# Patient Record
Sex: FEMALE | Race: WHITE | NOT HISPANIC OR LATINO | URBAN - METROPOLITAN AREA
[De-identification: names, ages, dates, MRNs, and addresses within clinical notes are randomized per-mention and may not be internally consistent; named-entity substitution may affect disease eponyms.]

---

## 2017-01-12 ENCOUNTER — HOSPITAL ENCOUNTER (OUTPATIENT)
Dept: MAMMOGRAPHY | Facility: CLINIC | Age: 62
Discharge: HOME | End: 2017-01-12
Attending: OBSTETRICS & GYNECOLOGY | Admitting: OBSTETRICS & GYNECOLOGY
Payer: COMMERCIAL

## 2017-01-12 DIAGNOSIS — Z12.31 VISIT FOR SCREENING MAMMOGRAM: ICD-10-CM

## 2017-01-12 PROCEDURE — G0202 SCR MAMMO BI INCL CAD: HCPCS

## 2018-03-05 ENCOUNTER — HOSPITAL ENCOUNTER (OUTPATIENT)
Dept: MAMMOGRAPHY | Facility: CLINIC | Age: 63
Discharge: HOME | End: 2018-03-05
Attending: OBSTETRICS & GYNECOLOGY | Admitting: OBSTETRICS & GYNECOLOGY
Payer: COMMERCIAL

## 2018-03-05 DIAGNOSIS — Z12.31 ENCOUNTER FOR SCREENING MAMMOGRAM FOR HIGH-RISK PATIENT: ICD-10-CM

## 2018-03-05 PROCEDURE — 77067 SCR MAMMO BI INCL CAD: CPT

## 2018-06-04 ENCOUNTER — TRANSFERRED RECORDS (OUTPATIENT)
Dept: HEALTH INFORMATION MANAGEMENT | Facility: CLINIC | Age: 63
End: 2018-06-04

## 2018-06-05 ENCOUNTER — HOSPITAL ENCOUNTER (EMERGENCY)
Facility: CLINIC | Age: 63
Discharge: HOME | End: 2018-06-05
Attending: EMERGENCY MEDICINE | Admitting: EMERGENCY MEDICINE
Payer: COMMERCIAL

## 2018-06-05 VITALS
SYSTOLIC BLOOD PRESSURE: 143 MMHG | DIASTOLIC BLOOD PRESSURE: 90 MMHG | HEART RATE: 75 BPM | RESPIRATION RATE: 16 BRPM | OXYGEN SATURATION: 98 % | WEIGHT: 155 LBS | TEMPERATURE: 98.1 F

## 2018-06-05 DIAGNOSIS — R10.32 ABDOMINAL PAIN, LEFT LOWER QUADRANT: ICD-10-CM

## 2018-06-05 PROCEDURE — 99282 EMERGENCY DEPT VISIT SF MDM: CPT

## 2018-06-05 ASSESSMENT — ENCOUNTER SYMPTOMS
BLOOD IN STOOL: 0
ABDOMINAL PAIN: 1

## 2018-06-05 NOTE — ED AVS SNAPSHOT
Emergency Department    [address] [place] [address]    Phone:  [phone]    Fax:  [phone]                                       Erica Dimas   MRN: [id number]    Department:   Emergency Department   Date of Visit:  6/5/2018           After Visit Summary Signature Page     I have received my discharge instructions, and my questions have been answered. I have discussed any challenges I see with this plan with the nurse or doctor.    ..........................................................................................................................................  Patient/Patient Representative Signature      ..........................................................................................................................................  Patient Representative Print Name and Relationship to Patient    ..................................................               ................................................  Date                                            Time    ..........................................................................................................................................  Reviewed by Signature/Title    ...................................................              ..............................................  Date                                                            Time

## 2018-06-05 NOTE — ED PROVIDER NOTES
History     Chief Complaint:    Abdominal Pain    HPI   Erica Dimas is a 62 year old female who presents to the ED today with abdominal pain. The patient states that she had a colonoscopy done yesterday at around 1300. She notes that this was a routine colonoscopy. Since that time, she has had some cramping pain on the left side of her abdomen. The patient denies any bleeding but states that she has not had any bowel movement and has not passed gas since her procedure.       Allergies:  No known drug allergies.     Medications:    Amitriptyline   Cozaar   Paxil   Prempro   Vicodin      Past Medical History:    Depressive disorder   Hypertension   Hypercholesteremia   Insomnia   Menopausal and postmenopausal disorder     Past Surgical History:    Colonoscopy     Family History:    Hypertension   Genitourinary problems     Social History:  Marital Status:    Presents to the ED alone   Tobacco Use: former smoker   Alcohol Use: yes   PCP: Erin Andres     Review of Systems   Constitutional: Negative for fever.   Respiratory: Negative for cough and shortness of breath.    Cardiovascular: Negative for chest pain.   Gastrointestinal: Positive for abdominal pain. Negative for blood in stool, nausea and vomiting.   All other systems reviewed and are negative.      Physical Exam   First Vitals:  BP: 143/90  Pulse: 75  Temp: 98.1  F (36.7  C)  Resp: 16  Weight: 70.3 kg (155 lb)  SpO2: 98 %      Physical Exam  General: Appears well-developed and well-nourished.   Head: No signs of trauma.   CV: Normal rate and regular rhythm.    Resp: Effort normal and breath sounds normal. No respiratory distress.   GI: Soft. There is no specific tenderness.  No rebound or guarding.  Normal bowel sounds.  No CVA tenderness.  MSK: Normal range of motion. no edema. No Calf tenderness.  Neuro: The patient is alert and oriented.  Speech normal.  GCS 15  Skin: Skin is warm and dry. No rash noted.   Psych: normal mood and affect.  behavior is normal.       Emergency Department Course   Emergency Department Course:  Nursing notes and vitals reviewed.  (4605) I performed an exam of the patient as documented above.    After discussing the risks and benefits of imaging, the patient chose to defer both xray and CT scans at this time.   Findings and plan explained to the patient. Patient discharged home with instructions regarding supportive care, medications, and reasons to return. The importance of close follow-up was reviewed.   Impression & Plan    Medical Decision Making:  Erica Dimas is a 62 year old female who presents with lower abdominal pain after colonoscopy.  Patient had a routine screening colonoscopy done that was completed without difficulty.  She reports that she has had some cramping and discomfort more on the left side of the abdomen and when she called the clinic she was instructed to come to the ER.  On my evaluation, her abdomen was quite soft with no tenderness to the right side.  She had only very minimal tenderness to the left lower quadrant but no peritoneal findings.  Given her overall very benign exam, I did discuss diagnostic options including watchful waiting, x-ray, or CT scan.  I did discuss that CT scan is by far the most sensitive test to evaluate for perforation or other pathology but also discussed the radiation exposure.  The patient's exam was very benign and her symptoms are minimal, patient preferred to not do any imaging but rather watch the symptoms and return if she had worsening pain, fevers, blood in her stool, or any other concerns.  I felt that this was very reasonable as the patient seemed to have good insight into her condition and the ability to return.  She was recommended to try Tylenol and warm packs to see if this would provide any relief.       Diagnosis:    ICD-10-CM    1. Abdominal pain, left lower quadrant R10.32        Disposition:  discharged to home      Diane PATRICK am  serving as a scribe on 6/5/2018 at 5:44 PM to personally document services performed by Dr. Omalley based on my observations and the provider's statements to me.    6/5/2018    EMERGENCY DEPARTMENT       Jed Omalley MD  06/09/18 7014

## 2018-06-05 NOTE — ED AVS SNAPSHOT
Emergency Department    [address] [facility] [address]    Phone:  [phone]    Fax:  [phone]                                       Erica Dimas   MRN: [id number]    Department:   Emergency Department   Date of Visit:  6/5/2018           Patient Information     Date Of Birth          1955        Your diagnoses for this visit were:     Abdominal pain, left lower quadrant        You were seen by Jed Omalley MD.      Follow-up Information     Follow up with Erin Andres In 5 days.    Specialty:  OB/Gyn    Contact information:    [facility]  [address]  [facility] [id number] [phone]          Follow up with  Emergency Department.    Specialty:  EMERGENCY MEDICINE    Why:  As needed, If symptoms worsen    Contact information:    [address] [facility] [id number] [phone]        Discharge Instructions         Abdominal Pain  You were seen for pain after a colonoscopy.  Your exam is reassuring at this time.  We discussed doing further testing including a CAT scan to look for a perforation from the procedure.  At this time you have decided to not do the CAT scan and monitor your symptoms.  Please return of you have worsening pain, fevers, or any other new or concerning symptoms.    Abdominal pain is pain in the stomach or belly area. Everyone has this pain from time to time. In many cases it goes away on its own. But abdominal pain can sometimes be due to a serious problem, such as appendicitis. So it s important to know when to seek help.  Causes of abdominal pain  There are many possible causes of abdominal pain. Common causes in adults include:    Constipation, diarrhea, or gas    Stomach acid flowing back up into the esophagus (acid reflux or heartburn)    Severe acid reflux, called GERD (gastroesophageal reflux disease)    A sore in the lining of the stomach or small intestine (peptic ulcer)    Inflammation of  the gallbladder, liver, or pancreas    Gallstones or kidney stones    Appendicitis     Intestinal blockage     An internal organ pushing through a muscle or other tissue (hernia)    Urinary tract infections    In women, menstrual cramps, fibroids, or endometriosis    Inflammation or infection of the intestines  Diagnosing the cause of abdominal pain  Your healthcare provider will do a physical exam help find the cause of your pain. If needed, tests will be ordered. Belly pain has many possible causes. So it can be hard to find the reason for your pain. Giving details about your pain can help. Tell your provider where and when you feel the pain, and what makes it better or worse. Also let your provider know if you have other symptoms such as:    Fever    Tiredness    Upset stomach (nausea)    Vomiting    Changes in bathroom habits  Treating abdominal pain  Some causes of pain need emergency medical treatment right away. These include appendicitis or a bowel blockage. Other problems can be treated with rest, fluids, or medicines. Your healthcare provider can give you specific instructions for treatment or self-care based on what is causing your pain.  If you have vomiting or diarrhea, sip water or other clear fluids. When you are ready to eat solid foods again, start with small amounts of easy-to-digest, low-fat foods. These include apple sauce, toast, or crackers.   When to seek medical care  Call 911 or go to the hospital right away if you:    Can t pass stool and are vomiting    Are vomiting blood or have bloody diarrhea or black, tarry diarrhea    Have chest, neck, or shoulder pain    Feel like you might pass out    Have pain in your shoulder blades with nausea    Have sudden, severe belly pain    Have new, severe pain unlike any you have felt before    Have a belly that is rigid, hard, and tender to touch  Call your healthcare provider if you have:    Pain for more than 5 days    Bloating for more than  2 days    Diarrhea for more than 5 days    A fever of 100.4 F (38 C) or higher, or as directed by your healthcare provider    Pain that gets worse    Weight loss for no reason    Continued lack of appetite    Blood in your stool  How to prevent abdominal pain  Here are some tips to help prevent abdominal pain:    Eat smaller amounts of food at one time.    Avoid greasy, fried, or other high-fat foods.    Avoid foods that give you gas.    Exercise regularly.    Drink plenty of fluids.  To help prevent GERD symptoms:    Quit smoking.    Reduce alcohol and certain foods that increase stomach acid.    Avoid aspirin and over-the-counter pain and fever medicines (NSAIDS or nonsteroidal anti-inflammatory drugs), if possible    Lose extra weight.    Finish eating at least 2 hours before you go to bed or lie down.    Raise the head of your bed.  Date Last Reviewed: 7/1/2016 2000-2017 [employer]. [address] [place] [address]. All rights reserved. This information is not intended as a substitute for professional medical care. Always follow your healthcare professional's instructions.          [facility] Scheduling Hotline     To schedule an appointment at [facility], please call [phone]. If you don't have a family doctor or clinic, we will help you find one. [facility] clinics are conveniently located to serve the needs of you and your family.           Review of your medicines      Our records show that you are taking the medicines listed below. If these are incorrect, please call your family doctor or clinic.        Dose / Directions Last dose taken    amitriptyline 10 MG tablet   Commonly known as:  ELAVIL   Quantity:  30        1 tab po qhs X 1 month; then change dosing prn   Refills:  0        COZAAR 100 MG tablet   Generic drug:  losartan        1 TABLET DAILY   Refills:  0        flax seed oil 1000 MG capsule        Refills:  0        MULTI-DAY Tabs        Refills:  0        PATANOL  0.1 % ophthalmic solution   Quantity:  1   Generic drug:  olopatadine        use as directed prn   Refills:  0        PAXIL 40 MG tablet   Generic drug:  PARoxetine        1 TABLET DAILY   Refills:  0        PREMPRO 0.625-2.5 MG per tablet   Quantity:  90   Generic drug:  estrogen (conjugated)-medroxyPROGESTERone        1 tab PO QD (Once per day)   Refills:  3        VICODIN 5-500 MG per tablet   Quantity:  28   Generic drug:  HYDROcodone-acetaminophen        ONE TO TWO TABLETS EVERY 4 TO 6 HOURS AS NEEDED FOR PAIN   Refills:  0                Orders Needing Specimen Collection     None      Pending Results     No orders found from 6/3/2018 to 6/6/2018.            Pending Culture Results     No orders found from 6/3/2018 to 6/6/2018.            Pending Results Instructions     If you had any lab results that were not finalized at the time of your Discharge, you can call the ED Lab Result RN at [phone]. You will be contacted by this team for any positive Lab results or changes in treatment. The nurses are available 7 days a week from 10A to 6:30P.  You can leave a message 24 hours per day and they will return your call.        Test Results From Your Hospital Stay               Clinical Quality Measure: Blood Pressure Screening     Your blood pressure was checked while you were in the emergency department today. The last reading we obtained was  BP: 143/90 . Please read the guidelines below about what these numbers mean and what you should do about them.  If your systolic blood pressure (the top number) is less than 120 and your diastolic blood pressure (the bottom number) is less than 80, then your blood pressure is normal. There is nothing more that you need to do about it.  If your systolic blood pressure (the top number) is 120-139 or your diastolic blood pressure (the bottom number) is 80-89, your blood pressure may be higher than it should be. You should have your blood pressure rechecked within a year by a  "primary care provider.  If your systolic blood pressure (the top number) is 140 or greater or your diastolic blood pressure (the bottom number) is 90 or greater, you may have high blood pressure. High blood pressure is treatable, but if left untreated over time it can put you at risk for heart attack, stroke, or kidney failure. You should have your blood pressure rechecked by a primary care provider within the next 4 weeks.  If your provider in the emergency department today gave you specific instructions to follow-up with your doctor or provider even sooner than that, you should follow that instruction and not wait for up to 4 weeks for your follow-up visit.        Thank you for choosing [facility]       Thank you for choosing [facility] for your care. Our goal is always to provide you with excellent care. Hearing back from our patients is one way we can continue to improve our services. Please take a few minutes to complete the written survey that you may receive in the mail after you visit with us. Thank you!        [employer] Information     [employer] lets you send messages to your doctor, view your test results, renew your prescriptions, schedule appointments and more. To sign up, go to [email or web address] . Click on \"Log in\" on the left side of the screen, which will take you to the Welcome page. Then click on \"Sign up Now\" on the right side of the page.     You will be asked to enter the access code listed below, as well as some personal information. Please follow the directions to create your username and password.     Your access code is: [email or web address]  Expires: 9/3/2018  6:07 PM     Your access code will  in 90 days. If you need help or a new code, please call your [facility] clinic or [phone].        Care EveryWhere ID     This is your Care EveryWhere ID. This could be used by other organizations to access your [facility] medical records  [id number]        Equal Access to Services     GLENN GALLEGOS AH: " Hadii charmaine Galan, wawestonda sharan, qaelishata kaalaugustina mcdonald. So [facility] [phone].    ATENCIÓN: Si habla español, tiene a haney disposición servicios gratuitos de asistencia lingüística. Llame al [phone].    We comply with applicable federal civil rights laws and [place] laws. We do not discriminate on the basis of race, color, national origin, age, disability, sex, sexual orientation, or gender identity.            After Visit Summary       This is your record. Keep this with you and show to your community pharmacist(s) and doctor(s) at your next visit.

## 2018-06-05 NOTE — DISCHARGE INSTRUCTIONS
Abdominal Pain  You were seen for pain after a colonoscopy.  Your exam is reassuring at this time.  We discussed doing further testing including a CAT scan to look for a perforation from the procedure.  At this time you have decided to not do the CAT scan and monitor your symptoms.  Please return of you have worsening pain, fevers, or any other new or concerning symptoms.    Abdominal pain is pain in the stomach or belly area. Everyone has this pain from time to time. In many cases it goes away on its own. But abdominal pain can sometimes be due to a serious problem, such as appendicitis. So it s important to know when to seek help.  Causes of abdominal pain  There are many possible causes of abdominal pain. Common causes in adults include:    Constipation, diarrhea, or gas    Stomach acid flowing back up into the esophagus (acid reflux or heartburn)    Severe acid reflux, called GERD (gastroesophageal reflux disease)    A sore in the lining of the stomach or small intestine (peptic ulcer)    Inflammation of the gallbladder, liver, or pancreas    Gallstones or kidney stones    Appendicitis     Intestinal blockage     An internal organ pushing through a muscle or other tissue (hernia)    Urinary tract infections    In women, menstrual cramps, fibroids, or endometriosis    Inflammation or infection of the intestines  Diagnosing the cause of abdominal pain  Your healthcare provider will do a physical exam help find the cause of your pain. If needed, tests will be ordered. Belly pain has many possible causes. So it can be hard to find the reason for your pain. Giving details about your pain can help. Tell your provider where and when you feel the pain, and what makes it better or worse. Also let your provider know if you have other symptoms such as:    Fever    Tiredness    Upset stomach (nausea)    Vomiting    Changes in bathroom habits  Treating abdominal pain  Some causes of pain need emergency medical treatment  right away. These include appendicitis or a bowel blockage. Other problems can be treated with rest, fluids, or medicines. Your healthcare provider can give you specific instructions for treatment or self-care based on what is causing your pain.  If you have vomiting or diarrhea, sip water or other clear fluids. When you are ready to eat solid foods again, start with small amounts of easy-to-digest, low-fat foods. These include apple sauce, toast, or crackers.   When to seek medical care  Call 911 or go to the hospital right away if you:    Can t pass stool and are vomiting    Are vomiting blood or have bloody diarrhea or black, tarry diarrhea    Have chest, neck, or shoulder pain    Feel like you might pass out    Have pain in your shoulder blades with nausea    Have sudden, severe belly pain    Have new, severe pain unlike any you have felt before    Have a belly that is rigid, hard, and tender to touch  Call your healthcare provider if you have:    Pain for more than 5 days    Bloating for more than 2 days    Diarrhea for more than 5 days    A fever of 100.4 F (38 C) or higher, or as directed by your healthcare provider    Pain that gets worse    Weight loss for no reason    Continued lack of appetite    Blood in your stool  How to prevent abdominal pain  Here are some tips to help prevent abdominal pain:    Eat smaller amounts of food at one time.    Avoid greasy, fried, or other high-fat foods.    Avoid foods that give you gas.    Exercise regularly.    Drink plenty of fluids.  To help prevent GERD symptoms:    Quit smoking.    Reduce alcohol and certain foods that increase stomach acid.    Avoid aspirin and over-the-counter pain and fever medicines (NSAIDS or nonsteroidal anti-inflammatory drugs), if possible    Lose extra weight.    Finish eating at least 2 hours before you go to bed or lie down.    Raise the head of your bed.  Date Last Reviewed: 7/1/2016 2000-2017 [employer]. [address] [place] [address]  [place] [address]. All rights reserved. This information is not intended as a substitute for professional medical care. Always follow your healthcare professional's instructions.

## 2018-06-05 NOTE — ED AVS SNAPSHOT
Emergency Department    [address] [facility] [address]    Phone:  [phone]    Fax:  [phone]                                       Erica Dimas   MRN: [id number]    Department:   Emergency Department   Date of Visit:  6/5/2018           Patient Information     Date Of Birth          1955        Your diagnoses for this visit were:     Abdominal pain, left lower quadrant        You were seen by Jed Omalley MD.      Follow-up Information     Follow up with Erin Andres In 5 days.    Specialty:  OB/Gyn    Contact information:    [facility] [place]  [address]  [facility] [address] [phone]          Follow up with  Emergency Department.    Specialty:  EMERGENCY MEDICINE    Why:  As needed, If symptoms worsen    Contact information:    [address] [facility] [id number] [phone]        Discharge Instructions         Abdominal Pain  You were seen for pain after a colonoscopy.  Your exam is reassuring at this time.  We discussed doing further testing including a CAT scan to look for a perforation from the procedure.  At this time you have decided to not do the CAT scan and monitor your symptoms.  Please return of you have worsening pain, fevers, or any other new or concerning symptoms.    Abdominal pain is pain in the stomach or belly area. Everyone has this pain from time to time. In many cases it goes away on its own. But abdominal pain can sometimes be due to a serious problem, such as appendicitis. So it s important to know when to seek help.  Causes of abdominal pain  There are many possible causes of abdominal pain. Common causes in adults include:    Constipation, diarrhea, or gas    Stomach acid flowing back up into the esophagus (acid reflux or heartburn)    Severe acid reflux, called GERD (gastroesophageal reflux disease)    A sore in the lining of the stomach or small intestine (peptic ulcer)    Inflammation of  the gallbladder, liver, or pancreas    Gallstones or kidney stones    Appendicitis     Intestinal blockage     An internal organ pushing through a muscle or other tissue (hernia)    Urinary tract infections    In women, menstrual cramps, fibroids, or endometriosis    Inflammation or infection of the intestines  Diagnosing the cause of abdominal pain  Your healthcare provider will do a physical exam help find the cause of your pain. If needed, tests will be ordered. Belly pain has many possible causes. So it can be hard to find the reason for your pain. Giving details about your pain can help. Tell your provider where and when you feel the pain, and what makes it better or worse. Also let your provider know if you have other symptoms such as:    Fever    Tiredness    Upset stomach (nausea)    Vomiting    Changes in bathroom habits  Treating abdominal pain  Some causes of pain need emergency medical treatment right away. These include appendicitis or a bowel blockage. Other problems can be treated with rest, fluids, or medicines. Your healthcare provider can give you specific instructions for treatment or self-care based on what is causing your pain.  If you have vomiting or diarrhea, sip water or other clear fluids. When you are ready to eat solid foods again, start with small amounts of easy-to-digest, low-fat foods. These include apple sauce, toast, or crackers.   When to seek medical care  Call 911 or go to the hospital right away if you:    Can t pass stool and are vomiting    Are vomiting blood or have bloody diarrhea or black, tarry diarrhea    Have chest, neck, or shoulder pain    Feel like you might pass out    Have pain in your shoulder blades with nausea    Have sudden, severe belly pain    Have new, severe pain unlike any you have felt before    Have a belly that is rigid, hard, and tender to touch  Call your healthcare provider if you have:    Pain for more than 5 days    Bloating for more than  2 days    Diarrhea for more than 5 days    A fever of 100.4 F (38 C) or higher, or as directed by your healthcare provider    Pain that gets worse    Weight loss for no reason    Continued lack of appetite    Blood in your stool  How to prevent abdominal pain  Here are some tips to help prevent abdominal pain:    Eat smaller amounts of food at one time.    Avoid greasy, fried, or other high-fat foods.    Avoid foods that give you gas.    Exercise regularly.    Drink plenty of fluids.  To help prevent GERD symptoms:    Quit smoking.    Reduce alcohol and certain foods that increase stomach acid.    Avoid aspirin and over-the-counter pain and fever medicines (NSAIDS or nonsteroidal anti-inflammatory drugs), if possible    Lose extra weight.    Finish eating at least 2 hours before you go to bed or lie down.    Raise the head of your bed.  Date Last Reviewed: 7/1/2016 2000-2017 [employer]. [address] [place] [address]. All rights reserved. This information is not intended as a substitute for professional medical care. Always follow your healthcare professional's instructions.          24 Hour Appointment Hotline       To make an appointment at any [facility], call [phone] ([phone]). If you don't have a family doctor or clinic, we will help you find one. [facility] clinics are conveniently located to serve the needs of you and your family.             Review of your medicines      Our records show that you are taking the medicines listed below. If these are incorrect, please call your family doctor or clinic.        Dose / Directions Last dose taken    amitriptyline 10 MG tablet   Commonly known as:  ELAVIL   Quantity:  30        1 tab po qhs X 1 month; then change dosing prn   Refills:  0        COZAAR 100 MG tablet   Generic drug:  losartan        1 TABLET DAILY   Refills:  0        flax seed oil 1000 MG capsule        Refills:  0        MULTI-DAY Tabs        Refills:  0         PATANOL 0.1 % ophthalmic solution   Quantity:  1   Generic drug:  olopatadine        use as directed prn   Refills:  0        PAXIL 40 MG tablet   Generic drug:  PARoxetine        1 TABLET DAILY   Refills:  0        PREMPRO 0.625-2.5 MG per tablet   Quantity:  90   Generic drug:  estrogen (conjugated)-medroxyPROGESTERone        1 tab PO QD (Once per day)   Refills:  3        VICODIN 5-500 MG per tablet   Quantity:  28   Generic drug:  HYDROcodone-acetaminophen        ONE TO TWO TABLETS EVERY 4 TO 6 HOURS AS NEEDED FOR PAIN   Refills:  0                Orders Needing Specimen Collection     None      Pending Results     No orders found from 6/3/2018 to 6/6/2018.            Pending Culture Results     No orders found from 6/3/2018 to 6/6/2018.            Pending Results Instructions     If you had any lab results that were not finalized at the time of your Discharge, you can call the ED Lab Result RN at [phone]. You will be contacted by this team for any positive Lab results or changes in treatment. The nurses are available 7 days a week from 10A to 6:30P.  You can leave a message 24 hours per day and they will return your call.        Test Results From Your Hospital Stay               Clinical Quality Measure: Blood Pressure Screening     Your blood pressure was checked while you were in the emergency department today. The last reading we obtained was  BP: 143/90 . Please read the guidelines below about what these numbers mean and what you should do about them.  If your systolic blood pressure (the top number) is less than 120 and your diastolic blood pressure (the bottom number) is less than 80, then your blood pressure is normal. There is nothing more that you need to do about it.  If your systolic blood pressure (the top number) is 120-139 or your diastolic blood pressure (the bottom number) is 80-89, your blood pressure may be higher than it should be. You should have your blood pressure rechecked  "within a year by a primary care provider.  If your systolic blood pressure (the top number) is 140 or greater or your diastolic blood pressure (the bottom number) is 90 or greater, you may have high blood pressure. High blood pressure is treatable, but if left untreated over time it can put you at risk for heart attack, stroke, or kidney failure. You should have your blood pressure rechecked by a primary care provider within the next 4 weeks.  If your provider in the emergency department today gave you specific instructions to follow-up with your doctor or provider even sooner than that, you should follow that instruction and not wait for up to 4 weeks for your follow-up visit.        Thank you for choosing [facility]       Thank you for choosing [facility] for your care. Our goal is always to provide you with excellent care. Hearing back from our patients is one way we can continue to improve our services. Please take a few minutes to complete the written survey that you may receive in the mail after you visit with us. Thank you!        [employer] Information     [employer] lets you send messages to your doctor, view your test results, renew your prescriptions, schedule appointments and more. To sign up, go to [email or web address] . Click on \"Log in\" on the left side of the screen, which will take you to the Welcome page. Then click on \"Sign up Now\" on the right side of the page.     You will be asked to enter the access code listed below, as well as some personal information. Please follow the directions to create your username and password.     Your access code is: [email or web address]  Expires: 9/3/2018  6:07 PM     Your access code will  in 90 days. If you need help or a new code, please call your [facility] clinic or [phone].        Care EveryWhere ID     This is your Care EveryWhere ID. This could be used by other organizations to access your [facility] medical records  [id number]        Equal Access to Services  "    GLENN GALLEGOS : Hadii charmaine Galan, waaxda luqadaha, qaybta kaalmada pranav, augustina abernathy. So [facility] [phone].    ATENCIÓN: Si habla español, tiene a haney disposición servicios gratuitos de asistencia lingüística. Llame al [phone].    We comply with applicable federal civil rights laws and [place] laws. We do not discriminate on the basis of race, color, national origin, age, disability, sex, sexual orientation, or gender identity.            After Visit Summary       This is your record. Keep this with you and show to your community pharmacist(s) and doctor(s) at your next visit.

## 2018-06-09 ASSESSMENT — ENCOUNTER SYMPTOMS
VOMITING: 0
FEVER: 0
NAUSEA: 0
SHORTNESS OF BREATH: 0
COUGH: 0

## 2019-03-06 ENCOUNTER — HOSPITAL ENCOUNTER (OUTPATIENT)
Dept: MAMMOGRAPHY | Facility: CLINIC | Age: 64
Discharge: HOME | End: 2019-03-06
Attending: OBSTETRICS & GYNECOLOGY | Admitting: OBSTETRICS & GYNECOLOGY
Payer: COMMERCIAL

## 2019-03-06 DIAGNOSIS — Z12.31 VISIT FOR SCREENING MAMMOGRAM: ICD-10-CM

## 2019-03-06 PROCEDURE — 77067 SCR MAMMO BI INCL CAD: CPT

## 2020-03-10 ENCOUNTER — HOSPITAL ENCOUNTER (OUTPATIENT)
Dept: MAMMOGRAPHY | Facility: CLINIC | Age: 65
Discharge: HOME | End: 2020-03-10
Attending: OBSTETRICS & GYNECOLOGY | Admitting: OBSTETRICS & GYNECOLOGY
Payer: COMMERCIAL

## 2020-03-10 DIAGNOSIS — Z12.31 VISIT FOR SCREENING MAMMOGRAM: ICD-10-CM

## 2020-03-10 PROCEDURE — 77067 SCR MAMMO BI INCL CAD: CPT

## 2021-03-11 ENCOUNTER — HOSPITAL ENCOUNTER (OUTPATIENT)
Dept: MAMMOGRAPHY | Facility: CLINIC | Age: 66
Discharge: HOME | End: 2021-03-11
Attending: OBSTETRICS & GYNECOLOGY | Admitting: OBSTETRICS & GYNECOLOGY
Payer: COMMERCIAL

## 2021-03-11 DIAGNOSIS — Z12.31 VISIT FOR SCREENING MAMMOGRAM: ICD-10-CM

## 2021-03-11 PROCEDURE — 77063 BREAST TOMOSYNTHESIS BI: CPT

## 2021-03-21 ENCOUNTER — HEALTH MAINTENANCE LETTER (OUTPATIENT)
Age: 66
End: 2021-03-21

## 2021-09-05 ENCOUNTER — HEALTH MAINTENANCE LETTER (OUTPATIENT)
Age: 66
End: 2021-09-05

## 2022-03-21 ENCOUNTER — HOSPITAL ENCOUNTER (OUTPATIENT)
Dept: MAMMOGRAPHY | Facility: CLINIC | Age: 67
Discharge: HOME | End: 2022-03-21
Attending: OBSTETRICS & GYNECOLOGY | Admitting: OBSTETRICS & GYNECOLOGY
Payer: COMMERCIAL

## 2022-03-21 DIAGNOSIS — Z12.31 VISIT FOR SCREENING MAMMOGRAM: ICD-10-CM

## 2022-03-21 PROCEDURE — 77067 SCR MAMMO BI INCL CAD: CPT

## 2022-04-16 ENCOUNTER — HEALTH MAINTENANCE LETTER (OUTPATIENT)
Age: 67
End: 2022-04-16

## 2022-10-22 ENCOUNTER — HEALTH MAINTENANCE LETTER (OUTPATIENT)
Age: 67
End: 2022-10-22

## 2023-10-24 ENCOUNTER — HOSPITAL ENCOUNTER (OUTPATIENT)
Dept: MAMMOGRAPHY | Facility: CLINIC | Age: 68
Discharge: HOME | End: 2023-10-24
Attending: INTERNAL MEDICINE | Admitting: INTERNAL MEDICINE
Payer: COMMERCIAL

## 2023-10-24 DIAGNOSIS — Z12.31 VISIT FOR SCREENING MAMMOGRAM: ICD-10-CM

## 2023-10-24 PROCEDURE — 77067 SCR MAMMO BI INCL CAD: CPT

## 2023-11-05 ENCOUNTER — HEALTH MAINTENANCE LETTER (OUTPATIENT)
Age: 68
End: 2023-11-05

## 2024-03-02 ENCOUNTER — OFFICE VISIT (OUTPATIENT)
Dept: URGENT CARE | Facility: URGENT CARE | Age: 69
End: 2024-03-02
Payer: COMMERCIAL

## 2024-03-02 VITALS
SYSTOLIC BLOOD PRESSURE: 118 MMHG | DIASTOLIC BLOOD PRESSURE: 83 MMHG | HEART RATE: 88 BPM | OXYGEN SATURATION: 99 % | TEMPERATURE: 98.7 F | RESPIRATION RATE: 18 BRPM | WEIGHT: 152 LBS

## 2024-03-02 DIAGNOSIS — J01.90 ACUTE SINUSITIS WITH SYMPTOMS > 10 DAYS: ICD-10-CM

## 2024-03-02 DIAGNOSIS — J30.89 NON-SEASONAL ALLERGIC RHINITIS, UNSPECIFIED TRIGGER: Primary | ICD-10-CM

## 2024-03-02 PROCEDURE — 99203 OFFICE O/P NEW LOW 30 MIN: CPT | Performed by: PHYSICIAN ASSISTANT

## 2024-03-02 RX ORDER — LEVOCETIRIZINE DIHYDROCHLORIDE 5 MG/1
5 TABLET, FILM COATED ORAL EVERY EVENING
Qty: 30 TABLET | Refills: 11 | Status: SHIPPED | OUTPATIENT
Start: 2024-03-02

## 2024-03-02 RX ORDER — PREDNISONE 20 MG/1
20 TABLET ORAL DAILY
Qty: 5 TABLET | Refills: 0 | Status: SHIPPED | OUTPATIENT
Start: 2024-03-02 | End: 2024-03-07

## 2024-03-02 RX ORDER — FLUTICASONE PROPIONATE 50 MCG
1 SPRAY, SUSPENSION (ML) NASAL DAILY
Qty: 18.2 ML | Refills: 11 | Status: SHIPPED | OUTPATIENT
Start: 2024-03-02

## 2024-03-02 RX ORDER — CEFUROXIME AXETIL 500 MG/1
500 TABLET ORAL 2 TIMES DAILY
Qty: 20 TABLET | Refills: 0 | Status: SHIPPED | OUTPATIENT
Start: 2024-03-02 | End: 2024-03-07

## 2024-03-02 NOTE — PATIENT INSTRUCTIONS
(J30.89) Non-seasonal allergic rhinitis, unspecified trigger  (primary encounter diagnosis)  Comment:   Plan: fluticasone (FLONASE) 50 MCG/ACT nasal spray 2 sprays each nostril at bedtime (you may decreased to 1 spray each nostril at bedtime if you r nasal passages feel dry),         levocetirizine (XYZAL) 5 MG tablet, predniSONE         (DELTASONE) 20 MG tablet            Use saline nasal spray during the day.    STAY ON THE ALLERGY MEDICATION DAILY.         (J01.90) Acute sinusitis with symptoms > 10 days  Comment: start this ONLY if you do not feel relief after 5 days of the prednisone  Plan: cefuroxime (CEFTIN) 500 MG tablet            Follow up with ENT should symptoms persist or worsen.

## 2024-03-02 NOTE — PROGRESS NOTES
Patient presents with:  Sinus Problem: Ongoing sinus infection. Was treated with Augmentin and guaifenesin on 02/14/2024. Negative covid test at home. Complains of cough and post nasal drip    (J30.89) Non-seasonal allergic rhinitis, unspecified trigger  (primary encounter diagnosis)  Comment:   Plan: fluticasone (FLONASE) 50 MCG/ACT nasal spray 2 sprays each nostril at bedtime (you may decreased to 1 spray each nostril at bedtime if you r nasal passages feel dry),         levocetirizine (XYZAL) 5 MG tablet, predniSONE         (DELTASONE) 20 MG tablet            Use saline nasal spray during the day.    STAY ON THE ALLERGY MEDICATION DAILY.         (J01.90) Acute sinusitis with symptoms > 10 days  Comment: start this ONLY if you do not feel relief after 5 days of the prednisone  Plan: cefuroxime (CEFTIN) 500 MG tablet            Follow up with ENT should symptoms persist or worsen.        At the end of the encounter, I discussed results, diagnosis, medications. Discussed red flags for immediate return to clinic/ER, as well as indications for follow up if no improvement. Patient understood and agreed to plan. Patient was stable for discharge     If not improving or if condition worsens, follow up with your Primary Care Provider      31 minutes spent by me on the date of the encounter doing chart review, patient visit, and documentation       SUBJECTIVE:   Erica Dimas is a 68 year old female who presents today with persistent sinus congestion, post nasal drip and cough since November.  Took OTC allergy medications (not sure which ones) intermittently.  Was treated with Augmentin on 2/14/24 and felt some relief while on the antibiotic, but symptoms recurred after finishing.  Cough is worse at night.        Patient Active Problem List   Diagnosis    Depressive disorder, not elsewhere classified    Insomnia    Essential hypertension, benign    Other specified menopausal and postmenopausal disorder         Past  Medical History:   Diagnosis Date    Depressive disorder, not elsewhere classified     Essential hypertension, benign     High cholesterol     Insomnia, unspecified     Other specified menopausal and postmenopausal disorder          Current Outpatient Medications   Medication Sig Dispense Refill    Multiple Vitamins-Iron (DAILY-CORNELIO/IRON/BETA-CAROTENE) TABS TAKE 1 TABLET BY MOUTH DAILY. (Patient not taking: Reported on 10/19/2020) 30 tablet 7     Social History     Tobacco Use    Smoking status: Never Smoker    Smokeless tobacco: Never Used   Substance Use Topics    Alcohol use: Not on file     Family History   Problem Relation Age of Onset    Diabetes Mother     Diabetes Father          ROS:    10 point ROS of systems including Constitutional, Eyes, Respiratory, Cardiovascular, Gastroenterology, Genitourinary, Integumentary, Muscularskeletal, Psychiatric ,neurological were all negative except for pertinent positives noted in my HPI       OBJECTIVE:  /83 (BP Location: Right arm, Patient Position: Sitting, Cuff Size: Adult Regular)   Pulse 88   Temp 98.7  F (37.1  C) (Oral)   Resp 18   Wt 68.9 kg (152 lb)   SpO2 99%   Physical Exam:  GENERAL APPEARANCE: healthy, alert and no distress  EYES: EOMI,  PERRL, conjunctiva clear  HENT: ear canals and TM's normal.  Nose and mouth without ulcers, erythema or lesions.  POST nasal drip.   HENT: nasal turbinates boggy with bluish hue and rhinorrhea clear  NECK: supple, nontender, no lymphadenopathy  RESP: lungs clear to auscultation - no rales, rhonchi or wheezes  CV: regular rates and rhythm, normal S1 S2, no murmur noted  ABDOMEN:  soft, nontender, no HSM or masses and bowel sounds normal  NEURO: Normal strength and tone, sensory exam grossly normal,  normal speech and mentation  SKIN: no suspicious lesions or rashes

## 2024-03-04 ENCOUNTER — TELEPHONE (OUTPATIENT)
Dept: URGENT CARE | Facility: URGENT CARE | Age: 69
End: 2024-03-04
Payer: COMMERCIAL

## 2024-03-04 NOTE — TELEPHONE ENCOUNTER
Called patient's # and left message to call us back as to what medication she is referring to?  Rachel Ward LPN

## 2024-03-04 NOTE — TELEPHONE ENCOUNTER
Patient Returning call. She is requesting to speak with the team in regards to her visit on 3/2. Patient states she was instructed to start taking Ceftin 500mg if Prednisone was not helping to clear up symptoms. She is unsure if the Prednisone is working and curious if she should begin taking the Ceftin. Currently only taking Fluticasone, Levocetirizine, Prednisone. Please call patient 2x if patient is unable to answer the first call back. Ok to leave a detailed message.

## 2024-03-04 NOTE — TELEPHONE ENCOUNTER
Patient Returning Call    Reason for call:  patient requesting a call back regarding medication that she was prescribe 3/2/2024.    Information relayed to patient:  N/A    Patient has additional questions:  No    What are your questions/concerns:  N/A    Could we send this information to you in [facility] or would you prefer to receive a phone call?:   Patient would prefer a phone call   Okay to leave a detailed message?: Yes at Cell number on file:    Telephone Information:   Mobile [phone]      Orthopedic

## 2024-03-05 NOTE — TELEPHONE ENCOUNTER
I left a message for the patient to go ahead and start the Ceftin if her symptoms have not significantly improved on the prednisone.  She should continue to take the prednisone, levocetirizine and fluticasone as well.      Ann Marie HUNTER, PA-C

## 2024-03-06 NOTE — TELEPHONE ENCOUNTER
Patient states she already received Ann Marie's message below. She states it is her last day taking prednisone which is day 5, and she is concerned if she runs out she could get worse again. At night she is still coughing, she is not coughing much up, but is very congested. She is not sure if she will continue to get, better, or if she should take the next medication which is ceftin?  [phone]

## 2024-03-07 DIAGNOSIS — J01.90 ACUTE SINUSITIS WITH SYMPTOMS > 10 DAYS: Primary | ICD-10-CM

## 2024-03-07 RX ORDER — CEFUROXIME AXETIL 500 MG/1
500 TABLET ORAL 2 TIMES DAILY
Qty: 20 TABLET | Refills: 0 | Status: SHIPPED | OUTPATIENT
Start: 2024-03-07 | End: 2024-03-17

## 2024-03-07 NOTE — PROGRESS NOTES
Spoke with patient, she is still having symptoms.  I advised her to start the Ceftin as we discussed in clinic.  She did not realize that she had the cefdinir as a written prescription.  I asked her to please void the prescription and I will send a new 1 over to her pharmacy today for her to start.    Should her symptoms persist after finishing the antibiotic, I would recommend that she is reevaluated by her primary doctor in clinic.

## 2024-10-25 ENCOUNTER — HOSPITAL ENCOUNTER (OUTPATIENT)
Dept: MAMMOGRAPHY | Facility: CLINIC | Age: 69
Discharge: HOME | End: 2024-10-25
Attending: INTERNAL MEDICINE | Admitting: INTERNAL MEDICINE
Payer: COMMERCIAL

## 2024-10-25 DIAGNOSIS — Z12.31 VISIT FOR SCREENING MAMMOGRAM: ICD-10-CM

## 2024-10-25 PROCEDURE — 77063 BREAST TOMOSYNTHESIS BI: CPT

## 2024-11-11 ENCOUNTER — OFFICE VISIT (OUTPATIENT)
Dept: URGENT CARE | Facility: URGENT CARE | Age: 69
End: 2024-11-11
Payer: COMMERCIAL

## 2024-11-11 VITALS
RESPIRATION RATE: 16 BRPM | HEART RATE: 92 BPM | TEMPERATURE: 98.7 F | OXYGEN SATURATION: 97 % | DIASTOLIC BLOOD PRESSURE: 82 MMHG | SYSTOLIC BLOOD PRESSURE: 122 MMHG

## 2024-11-11 DIAGNOSIS — J01.90 ACUTE SINUSITIS WITH COEXISTING CONDITION REQUIRING PROPHYLACTIC TREATMENT: Primary | ICD-10-CM

## 2024-11-11 PROCEDURE — 99213 OFFICE O/P EST LOW 20 MIN: CPT | Performed by: FAMILY MEDICINE

## 2024-11-11 RX ORDER — LOSARTAN POTASSIUM 100 MG/1
100 TABLET ORAL DAILY
COMMUNITY
Start: 2024-11-08 | End: 2025-11-08

## 2024-11-11 RX ORDER — ESCITALOPRAM OXALATE 10 MG/1
TABLET ORAL
COMMUNITY
Start: 2024-11-01

## 2024-11-11 RX ORDER — IBANDRONATE SODIUM 150 MG/1
150 TABLET, FILM COATED ORAL
COMMUNITY
Start: 2024-10-24 | End: 2025-10-24

## 2024-11-11 RX ORDER — DOXYCYCLINE 100 MG/1
100 TABLET ORAL 2 TIMES DAILY
Qty: 14 TABLET | Refills: 0 | Status: SHIPPED | OUTPATIENT
Start: 2024-11-11 | End: 2024-11-18

## 2024-11-11 RX ORDER — EVOLOCUMAB 140 MG/ML
INJECTION, SOLUTION SUBCUTANEOUS
COMMUNITY

## 2024-11-11 RX ORDER — AMLODIPINE BESYLATE 5 MG/1
5 TABLET ORAL DAILY
COMMUNITY
Start: 2024-11-08 | End: 2025-11-08

## 2024-11-11 RX ORDER — CLONAZEPAM 0.5 MG/1
TABLET ORAL
COMMUNITY
Start: 2024-09-06

## 2024-11-11 RX ORDER — EZETIMIBE 10 MG/1
10 TABLET ORAL DAILY
COMMUNITY
Start: 2024-11-08 | End: 2025-11-08

## 2024-11-11 NOTE — PROGRESS NOTES
SUBJECTIVE: Erica Dimas is a 69 year old female here with concerns about sinus infection.  She states onset  of symptoms were greater than 10 days with sinus pressure and drainage.  Course of illness is worsening.    Severity: moderate  Current and Associated symptoms: cold symptoms  Predisposing factors include none.   Recent treatment has included: OTC's    Past Medical History:   Diagnosis Date    Depressive disorder, not elsewhere classified     Essential hypertension, benign     High cholesterol     Insomnia, unspecified     Other specified menopausal and postmenopausal disorder      Allergies   Allergen Reactions    Phenazopyridine Nausea and Vomiting    No Clinical Screening - See Comments      PN: LW Other1: -azo-nausea    No Known Drug Allergy      Social History     Tobacco Use    Smoking status: Former     Current packs/day: 0.00     Types: Cigarettes     Quit date: 10/1/1999     Years since quittin.1    Smokeless tobacco: Never    Tobacco comments:     Occasional smoking only    Substance Use Topics    Alcohol use: Yes     Comment: 7 drinks per week       ROS:   no rash  no vomiting    OBJECTIVE:  /82   Pulse 92   Temp 98.7  F (37.1  C) (Tympanic)   Resp 16   SpO2 97%   NAD  EYES: clear, no mattering  EARS: TM's clear, no redness/buldging, canals clear, no swelling/redness  NOSE: discolored discharge naz. Nares  THROAT: clear, no erythema, no exudate  SINUS: maxillary tenderness with palpation  LUNGS: CTAB, no rhonchi/wheeze/rales      ICD-10-CM    1. Acute sinusitis with coexisting condition requiring prophylactic treatment  J01.90 doxycycline monohydrate (ADOXA) 100 MG tablet          Follow up with primary clinic if not improving

## 2024-12-22 ENCOUNTER — HEALTH MAINTENANCE LETTER (OUTPATIENT)
Age: 69
End: 2024-12-22